# Patient Record
Sex: FEMALE | URBAN - METROPOLITAN AREA
[De-identification: names, ages, dates, MRNs, and addresses within clinical notes are randomized per-mention and may not be internally consistent; named-entity substitution may affect disease eponyms.]

---

## 2020-11-10 ENCOUNTER — OFFICE VISIT (OUTPATIENT)
Dept: ORTHOPEDIC SURGERY | Age: 60
End: 2020-11-10
Payer: COMMERCIAL

## 2020-11-10 VITALS — BODY MASS INDEX: 39.27 KG/M2 | HEIGHT: 64 IN | WEIGHT: 230 LBS

## 2020-11-10 PROCEDURE — 99243 OFF/OP CNSLTJ NEW/EST LOW 30: CPT | Performed by: ORTHOPAEDIC SURGERY

## 2020-11-10 PROCEDURE — 20610 DRAIN/INJ JOINT/BURSA W/O US: CPT | Performed by: ORTHOPAEDIC SURGERY

## 2020-11-10 RX ORDER — METHYLPREDNISOLONE ACETATE 40 MG/ML
80 INJECTION, SUSPENSION INTRA-ARTICULAR; INTRALESIONAL; INTRAMUSCULAR; SOFT TISSUE ONCE
Status: COMPLETED | OUTPATIENT
Start: 2020-11-10 | End: 2020-11-10

## 2020-11-10 RX ORDER — PRIMIDONE 50 MG/1
TABLET ORAL
COMMUNITY
Start: 2020-10-09

## 2020-11-10 RX ORDER — CITALOPRAM 40 MG/1
TABLET ORAL
COMMUNITY
Start: 2020-09-05

## 2020-11-10 RX ORDER — LIDOCAINE HYDROCHLORIDE 10 MG/ML
8 INJECTION, SOLUTION INFILTRATION; PERINEURAL ONCE
Status: COMPLETED | OUTPATIENT
Start: 2020-11-10 | End: 2020-11-10

## 2020-11-10 RX ORDER — BUPROPION HYDROCHLORIDE 300 MG/1
TABLET ORAL
COMMUNITY
Start: 2020-11-08

## 2020-11-10 RX ORDER — PANTOPRAZOLE SODIUM 40 MG/1
TABLET, DELAYED RELEASE ORAL
COMMUNITY
Start: 2020-10-08

## 2020-11-10 RX ORDER — MELOXICAM 15 MG/1
15 TABLET ORAL DAILY
Qty: 30 TABLET | Refills: 3 | Status: SHIPPED | OUTPATIENT
Start: 2020-11-10 | End: 2020-12-10

## 2020-11-10 RX ORDER — KETOCONAZOLE 20 MG/G
CREAM TOPICAL
COMMUNITY
Start: 2020-08-20

## 2020-11-10 RX ADMIN — LIDOCAINE HYDROCHLORIDE 8 ML: 10 INJECTION, SOLUTION INFILTRATION; PERINEURAL at 17:34

## 2020-11-10 RX ADMIN — METHYLPREDNISOLONE ACETATE 80 MG: 40 INJECTION, SUSPENSION INTRA-ARTICULAR; INTRALESIONAL; INTRAMUSCULAR; SOFT TISSUE at 17:35

## 2020-11-10 NOTE — LETTER
Physical Therapy Rehabilitation Referral    Patient Name:  Bonita Mae      YOB: 1960    Diagnosis:    1. Left shoulder pain, unspecified chronicity    2. Adhesive capsulitis of left shoulder    3. Biceps tendinitis of left upper extremity    4. Arthralgia of left acromioclavicular joint        Precautions:     [x] Evaluate and Treat    Post Op Instructions:  [] Continuous passive motion (CPM) [] Elbow ROM  [x] Exercise in plane of scapula  []  Strengthening     [x] Pulley and instruction   [x] Home exercise program (copy to patient)   [] Sling when arm at risk  [] Sling or brace at all times   [] AAROM: Forward elevation to  140            [] AAROM: External rotation  To  40    [] Isometric external rotator strengthening [] AAROM: internal rotation: up the back  [x] Isometric abductor strengthening  [] AAROM: Internal abduction   [] Isometric internal rotator strengthening [] AAROM: cross-body adduction             Stretching:     Strengthening:  [x] Four quadrant (FE, ER, IR, CBA)  [] Rotator cuff (ER, IR, Abd)  [x] Forward Elevation    [] External Rotators     [x] External Rotation    [] Internal Rotators  [x] Internal Rotation: up/back   [] Abductors     [x] Internal Rotation: supine in abduction  [x] Sleeper Stretch    [] Flexors  [x] Cross-body abduction    [] Extensors  [x] Pendulum (FE, Abd/Add, cw/ccw)  [x] Scapular Stabilizers   [x] Wall-walking (FE, Abd)        [x] Shoulder shrugs     [x] Table slides (FE)                [x] Rhomboid pinch  [] Elbow (flex, ext, pron, sup)        [] Lat.  Pull downs     [] Medial epicondylitis program       [] Forward punch   [] Lateral epicondylitis program       [] Internal rotators     [] Progressive resistive exercises  [] Bench Press        [] Bench press plus  Activities:     [] Lateral pull-downs  [] Rowing     [] Progressive two-hand supine press  [] Stepper/Exercise bike   [] Biceps: curls/supination  [] Swimming  [] Water exercises Modalities:     Return to Sport:  [x] Of Choice      [] Plyometrics  [] Ultrasound     [] Rhythmic stabilization  [] Iontophoresis    [] Core strengthening   [] Moist heat     [] Sports specific program:   [] Massage         [x] Cryotherapy      [] Electrical stimulation     [] Paraffin  [] Whirlpool  [] TENS    [x] Home exercise program (copy to patient). Perform exercises for:   15     minutes    3      times/day  [x] Supervised physical therapy  Frequency: []  1x week  [x] 2x week  [] 3x week  [] Other:   Duration: [] 2 weeks   [] 4 weeks  [x] 6 weeks  [] Other:     Additional Instructions:     Jimbo Solano MD, PhD

## 2020-11-10 NOTE — PROGRESS NOTES
12 [facility]  History and Physical  Shoulder Pain    Date:  11/10/2020    Name:  Marcio Bowers  Address:  [place] [id number]    :  1960      Age:   63 y.o.    SSN:  [id number]      Medical Record Number:  <[id number]>    Reason for Visit:    Shoulder Pain (np left shoulder)      HPI:   Marcio Bowers is a 63 y.o. female who presents to our office today on referral from Dr. Judy Martin for consultation regarding ongoing left shoulder pain. She reports she began having pain in the left shoulder nearly 11 months ago after she hit a volleyball really hard during a match. She initially began having pain and than it progressed to having limited motion. She is having difficulty putting her cloths on. She feels sudden movements can really aggravate her symptoms. 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 Pain Assessment  Location of Pain: Shoulder  Location Modifiers: Left  Severity of Pain: 2  Quality of Pain: Aching, Dull, Sharp, Throbbing  Duration of Pain: Persistent  Frequency of Pain: Constant  Aggravating Factors: Other (Comment)(gen use)  Limiting Behavior: Yes  Relieving Factors: Rest  Result of Injury: No  Work-Related Injury: No  Are there other pain locations you wish to document?: No    Review of Systems:  A 14 point review of systems available in the scanned medical record as documented by the patient. The review is negative with the exception of those things mentioned in the History of Present Illness and Past Medical History.       Past History:  Past Medical History:   Diagnosis Date    Anxiety     Osteoarthritis      Past Surgical History:   Procedure Laterality Date    FOOT SURGERY Bilateral     GALLBLADDER SURGERY      HYSTERECTOMY      KNEE SURGERY Right      Current Outpatient Medications on File Prior to Visit   Medication Sig Dispense Refill    buPROPion (WELLBUTRIN XL) 300 MG extended release tablet       citalopram (CELEXA) 40 MG tablet TK 1 T PO D      ketoconazole (NIZORAL) 2 % cream STONE EXT AA D      pantoprazole (PROTONIX) 40 MG tablet TK 1 T PO D      primidone (MYSOLINE) 50 MG tablet TK 1 T PO QHS       No current facility-administered medications on file prior to visit. Social History     Socioeconomic History    Marital status:      Spouse name: Not on file    Number of children: Not on file    Years of education: Not on file    Highest education level: Not on file   Occupational History    Not on file   Social Needs    Financial resource strain: Not on file    Food insecurity     Worry: Not on file     Inability: Not on file    Transportation needs     Medical: Not on file     Non-medical: Not on file   Tobacco Use    Smoking status: Never Smoker    Smokeless tobacco: Never Used   Substance and Sexual Activity    Alcohol use: Not Currently    Drug use: Never    Sexual activity: Not on file   Lifestyle    Physical activity     Days per week: Not on file     Minutes per session: Not on file    Stress: Not on file   Relationships    Social connections     Talks on phone: Not on file     Gets together: Not on file     Attends Orthodoxy service: Not on file     Active member of club or organization: Not on file     Attends meetings of clubs or organizations: Not on file     Relationship status: Not on file    Intimate partner violence     Fear of current or ex partner: Not on file     Emotionally abused: Not on file     Physically abused: Not on file     Forced sexual activity: Not on file   Other Topics Concern    Not on file   Social History Narrative    Not on file     History reviewed. No pertinent family history.     Current Medications:    Current Outpatient Medications   Medication Sig Dispense Refill    buPROPion (WELLBUTRIN XL) 300 MG extended release tablet       citalopram (CELEXA) 40 MG tablet TK 1 T PO D      ketoconazole (NIZORAL) 2 % cream STONE EXT AA D      pantoprazole (PROTONIX) 40 MG tablet TK 1 T PO D      primidone (MYSOLINE) 50 MG tablet TK 1 T PO QHS       No current facility-administered medications for this visit. Allergies:  No Known Allergies    Physical Exam:  Vitals:     General: Renu Caicedo is a healthy and well appearing 63 y.o. female who is sitting comfortably in our office in acute distress. General Exam:   Constitutional: Patient is adequately groomed with no evidence of malnutrition  DTRs: Deep tendon reflexes are intact  Mental Status: The patient is oriented to time, place and person. The patient's mood and affect are appropriate. Lymphatic: The lymphatic examination bilaterally reveals all areas to be without enlargement or induration. Vascular: Examination reveals no swelling or calf tenderness. Peripheral pulses are palpable and 2+. Neurological: The patient has good coordination. There is no weakness or sensory deficit. Neuro: alert. Oriented X 3  Eyes: Extra-ocular muscles intact  Mouth: Oral mucosa moist. No perioral lesions  Pulm: Respirations unlabored and regular. left Shoulder Exam:  Inspection:  No gross deformities, no signs of infection. Palpation:  She has tenderness over the [facility] joint and Bicipital groove. No tenderness over the rotator cuff footprint, joint line    Active Range of Motion: Forward elevation of 130, external rotation with elbow at the side 30 vs 50, internal rotation to the back is L1 vs T7    Passive Range of Motion: Passively forward elevation can be further increased to 140. In supine position with her abduction her ER is 50 and IR of 20-30 and Cross body at 15 cm vs 10 cm     Strength:  She has 5/5 external and internal rotation with resistance, 4/5 supraspinatus with pain inhibition. Special Tests:  Negative champagne toast test.  No James muscle deformity.     Neurovascular: Sensation to light touch is intact, no bursa indicating bursitis  or related to the partial width tearing of supraspinatus. There is age typical glenohumeral degenerative change. There is no acute fracture type bone marrow edema identified. IMPRESSION:  1. Partial width full-thickness tear of the anterior cable attachment of supraspinatus with additional milder undersurface enthesial undermining of the posterior attachment. 2. Acromioclavicular and glenohumeral degenerative changes. 3. Fraying of the superior edge of subscapularis but without bicipital subluxation. Self assessment questionnaires including ASES and Simple Shoulder Test were completed today. Assessment:  Yohan Graham is a 63 y.o. female with left shoulder pain related to adhesive capsulitis. A trial of conservative treatment is most appropriate, and has a high likelihood of success. Impression:  Encounter Diagnoses   Name Primary?  Left shoulder pain, unspecified chronicity Yes    Adhesive capsulitis of left shoulder     Biceps tendinitis of left upper extremity     Arthralgia of left acromioclavicular joint        Office Procedures:  Orders Placed This Encounter   Procedures    XR SHOULDER LEFT (MIN 2 VIEWS)     Standing Status:   Future     Number of Occurrences:   1     Standing Expiration Date:   11/10/2021     Order Specific Question:   Reason for exam:     Answer:   pain    OSR PT [facility]     Referral Priority:   Routine     Referral Type:   Eval and Treat     Referral Reason:   Specialty Services Required     Requested Specialty:   Physical Therapy     Number of Visits Requested:   1    RI ARTHROCENTESIS ASPIR&/INJ MAJOR JT/BURSA W/O US     80 mg Depomedrol with 8 cc 1% Lidocaine in 10cc syringe with 25G (22G) needle       Plan:   Pertinent imaging was reviewed. The etiology, natural history, and treatment options for the disorder were discussed.   The roles of activity modifications, medications, cryotherapy and heat, injections, physical therapy, and surgical interventions were all described to the patient and questions were answered. We recommend that she start a targeted physical therapy program focused on stretches. Additionally we recommend doing a corticosteroid injection to help with some of the discomfort which she was agreeable to. We also called in a prescription for meloxicam to her pharmacy today. We will again review the MRI with the patient at her next visit. After discussing the risks and benefits of a corticosteroid injection, Alix did state an understanding and gave verbal consent to proceed. After cleansing the injection site with Chlora-prep and using aseptic techniques,  2 CC of Depo Medrol 40mg/ml and 8 CC of 1% lidocaine were injected in the left glenohumeral joint. She  tolerated the procedure well with no immediate adverse sequelae after the injection. A bandage was placed over the injection site. Appropriate post injections instructions were given to the patient. We will see her back in 4 weeks for reevaluation. 11/10/2020  4:42 PM      Mariann Mclean PA-C  Orthopaedic Sports Medicine Physician Assistant    During this examination, IMariann PA-C, functioned as a scribe for Dr. Koby Gentile. This dictation was performed with a verbal recognition program (DRAGON) and it was checked for errors. It is possible that there are still dictated errors within this office note. If so, please bring any errors to my attention for an addendum. All efforts were made to ensure that this office note is accurate.  ________________  I, Dr. Koby Gentile, personally performed the services described in this documentation as described by Mariann Mclean PA-C in my presence, and it is both accurate and complete. Jimbo Marroquin MD, PhD  11/10/2020

## 2020-12-10 ENCOUNTER — OFFICE VISIT (OUTPATIENT)
Dept: ORTHOPEDIC SURGERY | Age: 60
End: 2020-12-10
Payer: COMMERCIAL

## 2020-12-10 VITALS — WEIGHT: 230 LBS | TEMPERATURE: 97.1 F | HEIGHT: 64 IN | BODY MASS INDEX: 39.27 KG/M2

## 2020-12-10 PROCEDURE — 99212 OFFICE O/P EST SF 10 MIN: CPT | Performed by: ORTHOPAEDIC SURGERY

## 2020-12-10 NOTE — PROGRESS NOTES
12 [facility]  Office Visit  Follow up  Date:  12/10/2020    Name:  Annelise Escudero  Address:  [place] [id number]    :  1960      Age:   63 y.o.    SSN:  [id number]      Medical Record Number:  <[id number]>    Chief Complaint:    Left shoulder pain    HPI:   Annelise Escudero is a 63 y.o. female who is following up regarding her left shoulder. She was initially seen on 11/10/2020 at which time an MRI was reviewed, which showed a possible rotator cuff tear. We felt at that time she had some adhesive capsulitis and that a trial of conservative treatment would be the best option. She was provided with a intra-articular steroid injection as well as started in physical therapy. She states since her steroid injection as well as physical therapy she has 90% improvement in her pain. She states she has full range of motion. She is very pleased with her shoulder at this point. Pain Assessment  Location of Pain: Shoulder  Location Modifiers: Left  Severity of Pain: 1  Quality of Pain: Aching  Frequency of Pain: Intermittent  Limiting Behavior: Some  Relieving Factors: Ice, Exercise  Result of Injury: No  Work-Related Injury: No  Are there other pain locations you wish to document?: No    Past History:  Past Medical History:   Diagnosis Date    Anxiety     Osteoarthritis        Past Surgical History:   Procedure Laterality Date    FOOT SURGERY Bilateral     GALLBLADDER SURGERY      HYSTERECTOMY      KNEE SURGERY Right        Social History     Tobacco Use    Smoking status: Never Smoker    Smokeless tobacco: Never Used   Substance Use Topics    Alcohol use: Not Currently    Drug use: Never        Family History:  family history is not on file.          Current Outpatient Medications:     buPROPion (WELLBUTRIN XL) 300 MG extended release tablet, , Disp: , Rfl:     citalopram (CELEXA) 40 MG tablet, TK 1 T PO D, Disp: , Rfl:     ketoconazole were discussed. The roles of activity modifications, medications, injections, physical therapy, and surgical interventions were all described to the patient and questions were answered. Today the patient is doing much better. She has near full range of motion with respect to her left shoulder. She has very little pain and overall feels 90% improved. We would encourage her to finish her last few therapy sessions for the next few weeks and to continue taking her meloxicam for intermittent pain relief. At this point the patient to follow-up with us on an as-needed basis. [address] [place] [address] Fellow    The encounter with Amari Villeda was supervised by Dr Pia Johnson who personally examined the patient and reviewed the plan. This dictation was performed with a verbal recognition program (DRAGON) and it was checked for errors. It is possible that there are still dictated errors within this office note. If so, please bring any errors to my attention for an addendum. All efforts were made to ensure that this office note is accurate.   ____________________  I was physically present and personally supervised the Orthopaedic Sports Medicine Fellow in the evaluation and development of a treatment plan for this patient. I personally interviewed the patient and performed a physical examination. In addition, I discussed the patient's condition and treatment options with them. I have also reviewed and agree with the past medical, family and social history unless otherwise noted. All of the patient's questions were answered. Jimbo Johnson MD, PhD  12/10/2020